# Patient Record
Sex: FEMALE | Race: WHITE | HISPANIC OR LATINO
[De-identification: names, ages, dates, MRNs, and addresses within clinical notes are randomized per-mention and may not be internally consistent; named-entity substitution may affect disease eponyms.]

---

## 2019-07-18 ENCOUNTER — ASOB RESULT (OUTPATIENT)
Age: 29
End: 2019-07-18

## 2019-07-18 ENCOUNTER — APPOINTMENT (OUTPATIENT)
Dept: ANTEPARTUM | Facility: CLINIC | Age: 29
End: 2019-07-18
Payer: COMMERCIAL

## 2019-07-18 PROBLEM — Z00.00 ENCOUNTER FOR PREVENTIVE HEALTH EXAMINATION: Status: ACTIVE | Noted: 2019-07-18

## 2019-07-18 PROCEDURE — 36416 COLLJ CAPILLARY BLOOD SPEC: CPT

## 2019-07-18 PROCEDURE — 76801 OB US < 14 WKS SINGLE FETUS: CPT

## 2019-07-18 PROCEDURE — 76813 OB US NUCHAL MEAS 1 GEST: CPT

## 2019-09-14 ENCOUNTER — APPOINTMENT (OUTPATIENT)
Dept: ANTEPARTUM | Facility: CLINIC | Age: 29
End: 2019-09-14
Payer: COMMERCIAL

## 2019-09-14 ENCOUNTER — ASOB RESULT (OUTPATIENT)
Age: 29
End: 2019-09-14

## 2019-09-14 PROCEDURE — 76811 OB US DETAILED SNGL FETUS: CPT

## 2019-10-07 ENCOUNTER — OUTPATIENT (OUTPATIENT)
Dept: INPATIENT UNIT | Facility: HOSPITAL | Age: 29
LOS: 1 days | Discharge: HOME | End: 2019-10-07
Payer: COMMERCIAL

## 2019-10-07 VITALS
DIASTOLIC BLOOD PRESSURE: 77 MMHG | TEMPERATURE: 98 F | RESPIRATION RATE: 16 BRPM | SYSTOLIC BLOOD PRESSURE: 103 MMHG | HEART RATE: 79 BPM

## 2019-10-07 DIAGNOSIS — Z3A.00 WEEKS OF GESTATION OF PREGNANCY NOT SPECIFIED: ICD-10-CM

## 2019-10-07 DIAGNOSIS — Z98.82 BREAST IMPLANT STATUS: Chronic | ICD-10-CM

## 2019-10-07 DIAGNOSIS — O26.899 OTHER SPECIFIED PREGNANCY RELATED CONDITIONS, UNSPECIFIED TRIMESTER: ICD-10-CM

## 2019-10-07 LAB
APPEARANCE UR: SIGNIFICANT CHANGE UP
BACTERIA # UR AUTO: NEGATIVE — SIGNIFICANT CHANGE UP
BILIRUB UR-MCNC: NEGATIVE — SIGNIFICANT CHANGE UP
BLD GP AB SCN SERPL QL: NEGATIVE — SIGNIFICANT CHANGE UP
BLOOD UR QL VISUAL: SIGNIFICANT CHANGE UP
COLOR SPEC: SIGNIFICANT CHANGE UP
EPI CELLS # UR: SIGNIFICANT CHANGE UP
FIBRINOGEN PPP-MCNC: 689 MG/DL — HIGH (ref 350–510)
GLUCOSE UR-MCNC: NEGATIVE — SIGNIFICANT CHANGE UP
HCT VFR BLD CALC: 35 % — SIGNIFICANT CHANGE UP (ref 34.5–45)
HGB BLD-MCNC: 11.7 G/DL — SIGNIFICANT CHANGE UP (ref 11.5–15.5)
HYALINE CASTS # UR AUTO: NEGATIVE — SIGNIFICANT CHANGE UP
KETONES UR-MCNC: NEGATIVE — SIGNIFICANT CHANGE UP
LEUKOCYTE ESTERASE UR-ACNC: SIGNIFICANT CHANGE UP
MCHC RBC-ENTMCNC: 33.4 % — SIGNIFICANT CHANGE UP (ref 32–36)
MCHC RBC-ENTMCNC: 34.1 PG — HIGH (ref 27–34)
MCV RBC AUTO: 102 FL — HIGH (ref 80–100)
NITRITE UR-MCNC: NEGATIVE — SIGNIFICANT CHANGE UP
NRBC # FLD: 0 K/UL — SIGNIFICANT CHANGE UP (ref 0–0)
PH UR: 6.5 — SIGNIFICANT CHANGE UP (ref 5–8)
PLATELET # BLD AUTO: 176 K/UL — SIGNIFICANT CHANGE UP (ref 150–400)
PMV BLD: 10.8 FL — SIGNIFICANT CHANGE UP (ref 7–13)
PROT UR-MCNC: NEGATIVE — SIGNIFICANT CHANGE UP
RBC # BLD: 3.43 M/UL — LOW (ref 3.8–5.2)
RBC # FLD: 11.9 % — SIGNIFICANT CHANGE UP (ref 10.3–14.5)
RBC CASTS # UR COMP ASSIST: SIGNIFICANT CHANGE UP (ref 0–?)
RH IG SCN BLD-IMP: POSITIVE — SIGNIFICANT CHANGE UP
SP GR SPEC: 1.01 — SIGNIFICANT CHANGE UP (ref 1–1.04)
SQUAMOUS # UR AUTO: SIGNIFICANT CHANGE UP
UROBILINOGEN FLD QL: NORMAL — SIGNIFICANT CHANGE UP
WBC # BLD: 9.64 K/UL — SIGNIFICANT CHANGE UP (ref 3.8–10.5)
WBC # FLD AUTO: 9.64 K/UL — SIGNIFICANT CHANGE UP (ref 3.8–10.5)
WBC UR QL: HIGH (ref 0–?)

## 2019-10-07 PROCEDURE — 59025 FETAL NON-STRESS TEST: CPT | Mod: 26

## 2019-10-07 NOTE — OB PROVIDER TRIAGE NOTE - NSOBPROVIDERNOTE_OBGYN_ALL_OB_FT
@ 23.4 wks sent in from the office r/o abruption secondary to bright red blood noted on speculum exam in office. patient reports vaginal spotting (pinktinged) for past three days when she uses the bathroom, denies active bleeding, denies recent intercourse, denies vaginal inserts. Denies abdominal cramping or pain. Reports positive fetal movement  PNI: hx of low lying placenta,  not seen during today's sonogram    VS: 103/77  TOCo: ctx none  NST:   TVS: thick white discharge consistent with yeast, blood tinged discharge, no active bleeding. Cervical polyp x2 noted protruding from the cervix. Cervical Os closed  TVS: CL 4.5 cm, no dynamic changes, no funneling, no previa noted, placenta greater than 2 cm away from Os  TAS: breech, posterior placenta, gross fetal movement. adequate fluid  @ 23.4 wks sent in from the office r/o abruption secondary to bright red blood noted on speculum exam in office. patient reports vaginal spotting (pinktinged) for past three days when she uses the bathroom, denies active bleeding, denies recent intercourse, denies vaginal inserts. Denies abdominal cramping or pain. Reports positive fetal movement  PNI: hx of low lying placenta,  not seen during today's sonogram    VS: 103/77  TOCo: ctx none  NST:   TVS: thick white discharge consistent with yeast, blood tinged discharge, no active bleeding. Cervical polyp x2 noted protruding from the cervix. Cervical Os closed  TVS: CL 4.5 cm, no dynamic changes, no funneling, no previa noted, placenta greater than 2 cm away from Os  TAS: breech, posterior placenta, gross fetal movement. adequate fluid    @10:17 pm  Abruption labs negative  Type and screen pending  Patient requesting to leave  Dr. Alexandra contacted will call patient with lab results as needed

## 2019-10-07 NOTE — OB PROVIDER TRIAGE NOTE - HISTORY OF PRESENT ILLNESS
@ 23.4 wks sent in from the office r/o abruption secondary to bright red blood noted on speculum exam in office. patient reports vaginal spotting (pinktinged) for past three days when she uses the bathroom, denies active bleeding, denies recent intercourse, denies vaginal inserts. Denies abdominal cramping or pain. Reports positive fetal movement  PNI: hx of low lying placenta,  not seen during today's sonogram

## 2019-10-07 NOTE — OB RN TRIAGE NOTE - PSH
Pt says someone called him today He did not know who it was Pt has order in system for MRI Lspine he wants done at [address] [facility] on [address] [facility] does not have a number for location History of bilateral breast implants  2010

## 2019-10-07 NOTE — OB PROVIDER TRIAGE NOTE - NSHPLABSRESULTS_GEN_ALL_CORE
11.7   9.64  )-----------( 176      ( 07 Oct 2019 20:45 )             35.0                 Urinalysis Basic - ( 07 Oct 2019 20:45 )    Color: LIGHT YELLOW / Appearance: Lt TURBID / S.012 / pH: 6.5  Gluc: NEGATIVE / Ketone: NEGATIVE  / Bili: NEGATIVE / Urobili: NORMAL   Blood: SMALL / Protein: NEGATIVE / Nitrite: NEGATIVE   Leuk Esterase: LARGE / RBC: 3-5 / WBC 26-50   Sq Epi: FEW / Non Sq Epi: MODERATE / Bacteria: NEGATIVE

## 2019-10-07 NOTE — OB PROVIDER TRIAGE NOTE - NSHPPHYSICALEXAM_GEN_ALL_CORE
VS: 103/77  TOCo: ctx none  NST:   TVS: thick white discharge consistent with yeast, blood tinged discharge, no active bleeding. Cervical polyp x2 noted protruding from the cervix. Cervical Os closed  TVS: CL 4.5 cm, no dynamic changes, no funneling, no previa noted, plaacenta greater than 2 cm away from Os  TAS: breech, posterior placenta, gross fetal movement. adequate fluid

## 2020-01-29 ENCOUNTER — OUTPATIENT (OUTPATIENT)
Dept: INPATIENT UNIT | Facility: HOSPITAL | Age: 30
LOS: 1 days | Discharge: HOME | End: 2020-01-29
Payer: COMMERCIAL

## 2020-01-29 VITALS — HEART RATE: 61 BPM | DIASTOLIC BLOOD PRESSURE: 87 MMHG | SYSTOLIC BLOOD PRESSURE: 134 MMHG

## 2020-01-29 VITALS
RESPIRATION RATE: 17 BRPM | DIASTOLIC BLOOD PRESSURE: 68 MMHG | SYSTOLIC BLOOD PRESSURE: 115 MMHG | HEART RATE: 60 BPM | TEMPERATURE: 98 F

## 2020-01-29 DIAGNOSIS — Z3A.00 WEEKS OF GESTATION OF PREGNANCY NOT SPECIFIED: ICD-10-CM

## 2020-01-29 DIAGNOSIS — O26.899 OTHER SPECIFIED PREGNANCY RELATED CONDITIONS, UNSPECIFIED TRIMESTER: ICD-10-CM

## 2020-01-29 DIAGNOSIS — Z98.82 BREAST IMPLANT STATUS: Chronic | ICD-10-CM

## 2020-01-29 PROCEDURE — 59025 FETAL NON-STRESS TEST: CPT | Mod: 26

## 2020-01-29 PROCEDURE — 99202 OFFICE O/P NEW SF 15 MIN: CPT

## 2020-01-30 ENCOUNTER — INPATIENT (INPATIENT)
Facility: HOSPITAL | Age: 30
LOS: 1 days | Discharge: HOME | End: 2020-02-01
Attending: OBSTETRICS & GYNECOLOGY | Admitting: OBSTETRICS & GYNECOLOGY

## 2020-01-30 ENCOUNTER — TRANSCRIPTION ENCOUNTER (OUTPATIENT)
Age: 30
End: 2020-01-30

## 2020-01-30 VITALS
DIASTOLIC BLOOD PRESSURE: 88 MMHG | SYSTOLIC BLOOD PRESSURE: 138 MMHG | RESPIRATION RATE: 16 BRPM | HEART RATE: 76 BPM | TEMPERATURE: 98 F

## 2020-01-30 DIAGNOSIS — Z3A.00 WEEKS OF GESTATION OF PREGNANCY NOT SPECIFIED: ICD-10-CM

## 2020-01-30 DIAGNOSIS — O26.899 OTHER SPECIFIED PREGNANCY RELATED CONDITIONS, UNSPECIFIED TRIMESTER: ICD-10-CM

## 2020-01-30 DIAGNOSIS — Z98.82 BREAST IMPLANT STATUS: Chronic | ICD-10-CM

## 2020-01-30 PROBLEM — L40.9 PSORIASIS, UNSPECIFIED: Chronic | Status: ACTIVE | Noted: 2019-10-07

## 2020-01-30 LAB
BASOPHILS # BLD AUTO: 0.02 K/UL — SIGNIFICANT CHANGE UP (ref 0–0.2)
BASOPHILS NFR BLD AUTO: 0.1 % — SIGNIFICANT CHANGE UP (ref 0–2)
BLD GP AB SCN SERPL QL: NEGATIVE — SIGNIFICANT CHANGE UP
EOSINOPHIL # BLD AUTO: 0.03 K/UL — SIGNIFICANT CHANGE UP (ref 0–0.5)
EOSINOPHIL NFR BLD AUTO: 0.2 % — SIGNIFICANT CHANGE UP (ref 0–6)
HCT VFR BLD CALC: 36.5 % — SIGNIFICANT CHANGE UP (ref 34.5–45)
HGB BLD-MCNC: 13.1 G/DL — SIGNIFICANT CHANGE UP (ref 11.5–15.5)
IMM GRANULOCYTES NFR BLD AUTO: 0.4 % — SIGNIFICANT CHANGE UP (ref 0–1.5)
LYMPHOCYTES # BLD AUTO: 17.7 % — SIGNIFICANT CHANGE UP (ref 13–44)
LYMPHOCYTES # BLD AUTO: 2.5 K/UL — SIGNIFICANT CHANGE UP (ref 1–3.3)
MCHC RBC-ENTMCNC: 34.8 PG — HIGH (ref 27–34)
MCHC RBC-ENTMCNC: 35.9 % — SIGNIFICANT CHANGE UP (ref 32–36)
MCV RBC AUTO: 97.1 FL — SIGNIFICANT CHANGE UP (ref 80–100)
MONOCYTES # BLD AUTO: 1 K/UL — HIGH (ref 0–0.9)
MONOCYTES NFR BLD AUTO: 7.1 % — SIGNIFICANT CHANGE UP (ref 2–14)
NEUTROPHILS # BLD AUTO: 10.54 K/UL — HIGH (ref 1.8–7.4)
NEUTROPHILS NFR BLD AUTO: 74.5 % — SIGNIFICANT CHANGE UP (ref 43–77)
NRBC # FLD: 0 K/UL — SIGNIFICANT CHANGE UP (ref 0–0)
PLATELET # BLD AUTO: 161 K/UL — SIGNIFICANT CHANGE UP (ref 150–400)
PMV BLD: 10.6 FL — SIGNIFICANT CHANGE UP (ref 7–13)
RBC # BLD: 3.76 M/UL — LOW (ref 3.8–5.2)
RBC # FLD: 12.3 % — SIGNIFICANT CHANGE UP (ref 10.3–14.5)
RH IG SCN BLD-IMP: POSITIVE — SIGNIFICANT CHANGE UP
T PALLIDUM AB TITR SER: NEGATIVE — SIGNIFICANT CHANGE UP
WBC # BLD: 14.14 K/UL — HIGH (ref 3.8–10.5)
WBC # FLD AUTO: 14.14 K/UL — HIGH (ref 3.8–10.5)

## 2020-01-30 RX ORDER — OXYTOCIN 10 UNIT/ML
333.33 VIAL (ML) INJECTION
Qty: 20 | Refills: 0 | Status: COMPLETED | OUTPATIENT
Start: 2020-01-30 | End: 2020-01-30

## 2020-01-30 RX ORDER — SODIUM CHLORIDE 9 MG/ML
500 INJECTION, SOLUTION INTRAVENOUS ONCE
Refills: 0 | Status: COMPLETED | OUTPATIENT
Start: 2020-01-30 | End: 2020-01-30

## 2020-01-30 RX ORDER — IBUPROFEN 200 MG
600 TABLET ORAL EVERY 6 HOURS
Refills: 0 | Status: COMPLETED | OUTPATIENT
Start: 2020-01-30 | End: 2020-12-28

## 2020-01-30 RX ORDER — BENZOCAINE 10 %
1 GEL (GRAM) MUCOUS MEMBRANE EVERY 6 HOURS
Refills: 0 | Status: DISCONTINUED | OUTPATIENT
Start: 2020-01-30 | End: 2020-02-01

## 2020-01-30 RX ORDER — DIBUCAINE 1 %
1 OINTMENT (GRAM) RECTAL EVERY 6 HOURS
Refills: 0 | Status: DISCONTINUED | OUTPATIENT
Start: 2020-01-30 | End: 2020-02-01

## 2020-01-30 RX ORDER — DIPHENHYDRAMINE HCL 50 MG
25 CAPSULE ORAL EVERY 6 HOURS
Refills: 0 | Status: DISCONTINUED | OUTPATIENT
Start: 2020-01-30 | End: 2020-02-01

## 2020-01-30 RX ORDER — IBUPROFEN 200 MG
600 TABLET ORAL EVERY 6 HOURS
Refills: 0 | Status: DISCONTINUED | OUTPATIENT
Start: 2020-01-30 | End: 2020-02-01

## 2020-01-30 RX ORDER — SIMETHICONE 80 MG/1
80 TABLET, CHEWABLE ORAL EVERY 4 HOURS
Refills: 0 | Status: DISCONTINUED | OUTPATIENT
Start: 2020-01-30 | End: 2020-02-01

## 2020-01-30 RX ORDER — OXYCODONE HYDROCHLORIDE 5 MG/1
5 TABLET ORAL ONCE
Refills: 0 | Status: DISCONTINUED | OUTPATIENT
Start: 2020-01-30 | End: 2020-02-01

## 2020-01-30 RX ORDER — SODIUM CHLORIDE 9 MG/ML
3 INJECTION INTRAMUSCULAR; INTRAVENOUS; SUBCUTANEOUS EVERY 8 HOURS
Refills: 0 | Status: DISCONTINUED | OUTPATIENT
Start: 2020-01-30 | End: 2020-02-01

## 2020-01-30 RX ORDER — HYDROCORTISONE 1 %
1 OINTMENT (GRAM) TOPICAL EVERY 6 HOURS
Refills: 0 | Status: DISCONTINUED | OUTPATIENT
Start: 2020-01-30 | End: 2020-02-01

## 2020-01-30 RX ORDER — ACETAMINOPHEN 500 MG
975 TABLET ORAL
Refills: 0 | Status: DISCONTINUED | OUTPATIENT
Start: 2020-01-30 | End: 2020-02-01

## 2020-01-30 RX ORDER — LANOLIN
1 OINTMENT (GRAM) TOPICAL EVERY 6 HOURS
Refills: 0 | Status: DISCONTINUED | OUTPATIENT
Start: 2020-01-30 | End: 2020-02-01

## 2020-01-30 RX ORDER — AER TRAVELER 0.5 G/1
1 SOLUTION RECTAL; TOPICAL EVERY 4 HOURS
Refills: 0 | Status: DISCONTINUED | OUTPATIENT
Start: 2020-01-30 | End: 2020-02-01

## 2020-01-30 RX ORDER — MAGNESIUM HYDROXIDE 400 MG/1
30 TABLET, CHEWABLE ORAL
Refills: 0 | Status: DISCONTINUED | OUTPATIENT
Start: 2020-01-30 | End: 2020-02-01

## 2020-01-30 RX ORDER — KETOROLAC TROMETHAMINE 30 MG/ML
30 SYRINGE (ML) INJECTION ONCE
Refills: 0 | Status: DISCONTINUED | OUTPATIENT
Start: 2020-01-30 | End: 2020-01-30

## 2020-01-30 RX ORDER — GLYCERIN ADULT
1 SUPPOSITORY, RECTAL RECTAL AT BEDTIME
Refills: 0 | Status: DISCONTINUED | OUTPATIENT
Start: 2020-01-30 | End: 2020-02-01

## 2020-01-30 RX ORDER — PRAMOXINE HYDROCHLORIDE 150 MG/15G
1 AEROSOL, FOAM RECTAL EVERY 4 HOURS
Refills: 0 | Status: DISCONTINUED | OUTPATIENT
Start: 2020-01-30 | End: 2020-02-01

## 2020-01-30 RX ORDER — SODIUM CHLORIDE 9 MG/ML
1000 INJECTION, SOLUTION INTRAVENOUS
Refills: 0 | Status: DISCONTINUED | OUTPATIENT
Start: 2020-01-30 | End: 2020-01-30

## 2020-01-30 RX ORDER — TETANUS TOXOID, REDUCED DIPHTHERIA TOXOID AND ACELLULAR PERTUSSIS VACCINE, ADSORBED 5; 2.5; 8; 8; 2.5 [IU]/.5ML; [IU]/.5ML; UG/.5ML; UG/.5ML; UG/.5ML
0.5 SUSPENSION INTRAMUSCULAR ONCE
Refills: 0 | Status: DISCONTINUED | OUTPATIENT
Start: 2020-01-30 | End: 2020-02-01

## 2020-01-30 RX ORDER — OXYCODONE HYDROCHLORIDE 5 MG/1
5 TABLET ORAL
Refills: 0 | Status: DISCONTINUED | OUTPATIENT
Start: 2020-01-30 | End: 2020-02-01

## 2020-01-30 RX ORDER — OXYTOCIN 10 UNIT/ML
2 VIAL (ML) INJECTION
Qty: 30 | Refills: 0 | Status: DISCONTINUED | OUTPATIENT
Start: 2020-01-30 | End: 2020-02-01

## 2020-01-30 RX ADMIN — SODIUM CHLORIDE 500 MILLILITER(S): 9 INJECTION, SOLUTION INTRAVENOUS at 11:40

## 2020-01-30 RX ADMIN — Medication 600 MILLIGRAM(S): at 18:15

## 2020-01-30 RX ADMIN — Medication 600 MILLIGRAM(S): at 17:45

## 2020-01-30 RX ADMIN — Medication 2 MILLIUNIT(S)/MIN: at 05:00

## 2020-01-30 RX ADMIN — Medication 30 MILLIGRAM(S): at 08:25

## 2020-01-30 RX ADMIN — Medication 975 MILLIGRAM(S): at 21:30

## 2020-01-30 RX ADMIN — Medication 1000 MILLIUNIT(S)/MIN: at 08:25

## 2020-01-30 RX ADMIN — Medication 30 MILLIGRAM(S): at 09:41

## 2020-01-30 RX ADMIN — Medication 975 MILLIGRAM(S): at 21:01

## 2020-01-30 RX ADMIN — SODIUM CHLORIDE 125 MILLILITER(S): 9 INJECTION, SOLUTION INTRAVENOUS at 09:41

## 2020-01-30 RX ADMIN — Medication 0.25 MILLIGRAM(S): at 03:38

## 2020-01-30 NOTE — OB PROVIDER DELIVERY SUMMARY - NSPROVIDERDELIVERYNOTE_OBGYN_ALL_OB_FT
, female, nuchal cord x1 reduced, APGARS 9/9, weight 6lb 13oz   RML episiotomy repaired with vicryl suture   ebl 400cc

## 2020-01-30 NOTE — OB PROVIDER TRIAGE NOTE - ADDITIONAL INSTRUCTIONS
Patient cleared for discharge home at this time  labor precautions reviewed  fetal kick counts emphasized

## 2020-01-30 NOTE — OB PROVIDER TRIAGE NOTE - NSOBPROVIDERNOTE_OBGYN_ALL_OB_FT
pmh: denies  psh: breast implant 2010  obhx: denies  gynhx: denies    NKDA    Medications  PNV  Iron    Assessment  Vital Signs Last 24 Hrs  T(C): 36.6 (29 Jan 2020 23:23), Max: 36.6 (29 Jan 2020 23:23)  T(F): 97.88 (29 Jan 2020 23:23), Max: 97.88 (29 Jan 2020 23:23)  HR: 61 (29 Jan 2020 23:50) (58 - 61)  BP: 134/87 (29 Jan 2020 23:50) (115/68 - 134/87)  BP(mean): --  RR: 17 (29 Jan 2020 22:27) (17 - 17)  SpO2: --    regular heart rate; rhythm   lungs CTA     abdomen soft nontender gravid  (+) FHR; moderate variability; with accelerations  irregular uterine contractions noted on tocometer    Vaginal Exam: 2/50/-3    Biophysical Profile: 8/8  Amniotic Fluid Index: 11.59cm    Evidence of latent labor at this time    Patient cleared for discharge home at this time  labor precautions reviewed  fetal kick counts emphasized    patient discharged home ambulatory and stable    d/w Dr. Campos pmh: denies  psh: breast implant 2010  obhx: denies  gynhx: denies    NKDA    Medications  PNV  Iron    Assessment  Vital Signs Last 24 Hrs  T(C): 36.6 (29 Jan 2020 23:23), Max: 36.6 (29 Jan 2020 23:23)  T(F): 97.88 (29 Jan 2020 23:23), Max: 97.88 (29 Jan 2020 23:23)  HR: 61 (29 Jan 2020 23:50) (58 - 61)  BP: 134/87 (29 Jan 2020 23:50) (115/68 - 134/87)  BP(mean): --  RR: 17 (29 Jan 2020 22:27) (17 - 17)  SpO2: --    regular heart rate; rhythm   lungs CTA     abdomen soft nontender gravid  (+) FHR; moderate variability; with accelerations  irregular uterine contractions noted on tocometer    Vaginal Exam: 2/50/-3    Biophysical Profile: 8/8  Amniotic Fluid Index: 11.59cm  Cephalic; anterior placenta    Evidence of latent labor at this time    Patient cleared for discharge home at this time  labor precautions reviewed  fetal kick counts emphasized    patient discharged home ambulatory and stable    d/w Dr. Campos

## 2020-01-30 NOTE — OB PROVIDER TRIAGE NOTE - NSHPPHYSICALEXAM_GEN_ALL_CORE
Vital Signs Last 24 Hrs  T(C): 36.8 (30 Jan 2020 03:13), Max: 36.8 (30 Jan 2020 03:13)  T(F): 98.2 (30 Jan 2020 03:13), Max: 98.2 (30 Jan 2020 03:13)  HR: 76 (30 Jan 2020 03:20) (58 - 76)  BP: 138/88 (30 Jan 2020 03:20) (115/68 - 138/88)  BP(mean): --  RR: 16 (30 Jan 2020 03:13) (16 - 17)  SpO2: --    regular heart rate; rhythm   lungs CTA     abdomen soft nontender gravid  (+) FHR; moderate variability; with accelerations  irregular uterine contractions noted on tocometer    Vaginal Exam: 5/70/-3      Cephalic; anterior placenta

## 2020-01-30 NOTE — OB NEONATOLOGY/PEDIATRICIAN DELIVERY SUMMARY - NSPEDSNEONOTESA_OBGYN_ALL_OB_FT
40.0 week female born via  with a cat II tracing to a 30 y/o  O+, GBS- () , PNL unremarkable with SROM 3 hrs PTD and clear fluid. No significant maternal medical history. Infant emerged with nuchal X 1 but good cry and apgars 8/9. Routine care provided and transferred to well baby nursery.

## 2020-01-30 NOTE — OB PROVIDER TRIAGE NOTE - HISTORY OF PRESENT ILLNESS
29y.o.  G1 at 40weeks returning  with complaints of increased uterine contractions since discharge, now occurring q1 minutes 10/10 pain.  Patient reports positive fetal movement, denies leakage of fluid, denies vaginal bleeding.    a/p course complications patient denies

## 2020-01-30 NOTE — CHART NOTE - NSCHARTNOTEFT_GEN_A_CORE
late entry for exam @ 03:35   patient seen in triage for prolonged decel   VE: 5/90/-2, SROM, ISE placed   patient given 0.25 mg terbutaline, repositioning, O2 mask and fluid bolus   recovery of FHR to baseline after resuscitative measures   patient moved to LDR 16 after which epidural was successfully placed by anesthesia   pt rexamined at 04:25 and VE 5.5/100/0 at that time, IUPC placed   plan for pitocin augmentation

## 2020-01-30 NOTE — OB PROVIDER TRIAGE NOTE - NSHPPHYSICALEXAM_GEN_ALL_CORE
Vital Signs Last 24 Hrs  T(C): 36.6 (29 Jan 2020 23:23), Max: 36.6 (29 Jan 2020 23:23)  T(F): 97.88 (29 Jan 2020 23:23), Max: 97.88 (29 Jan 2020 23:23)  HR: 61 (29 Jan 2020 23:50) (58 - 61)  BP: 134/87 (29 Jan 2020 23:50) (115/68 - 134/87)  BP(mean): --  RR: 17 (29 Jan 2020 22:27) (17 - 17)  SpO2: --    regular heart rate; rhythm   lungs CTA     abdomen soft nontender gravid  (+) FHR; moderate variability; with accelerations  irregular uterine contractions noted on tocometer    Vaginal Exam: 2/50/-3    Biophysical Profile: 8/8  Amniotic Fluid Index: 11.59cm Vital Signs Last 24 Hrs  T(C): 36.6 (29 Jan 2020 23:23), Max: 36.6 (29 Jan 2020 23:23)  T(F): 97.88 (29 Jan 2020 23:23), Max: 97.88 (29 Jan 2020 23:23)  HR: 61 (29 Jan 2020 23:50) (58 - 61)  BP: 134/87 (29 Jan 2020 23:50) (115/68 - 134/87)  BP(mean): --  RR: 17 (29 Jan 2020 22:27) (17 - 17)  SpO2: --    regular heart rate; rhythm   lungs CTA     abdomen soft nontender gravid  (+) FHR; moderate variability; with accelerations  irregular uterine contractions noted on tocometer    Vaginal Exam: 2/50/-3    Biophysical Profile: 8/8  Amniotic Fluid Index: 11.59cm    Cephalic; anterior placenta

## 2020-01-30 NOTE — DISCHARGE NOTE OB - PATIENT PORTAL LINK FT
You can access the FollowMyHealth Patient Portal offered by [facility] by registering at the following website: [email or web address]. By joining [facility]s FollowMyHealth portal, you will also be able to view your health information using other applications (apps) compatible with our system.

## 2020-01-30 NOTE — OB RN DELIVERY SUMMARY - NS_SEPSISRSKCALC_OBGYN_ALL_OB_FT
EOS calculated successfully. EOS Risk Factor: 0.5/1000 live births ([facility] national incidence); GA=40w;Temp=98.6; ROM=3.283; GBS='Negative'; Antibiotics='No antibiotics or any antibiotics < 2 hrs prior to birth'

## 2020-01-30 NOTE — OB PROVIDER H&P - ASSESSMENT
pmh: denies  psh: breast implant 2010  obhx: denies  gynhx: denies    NKDA    Medications  PNV  Iron    Assessment  Vital Signs Last 24 Hrs  T(C): 36.6 (29 Jan 2020 23:23), Max: 36.6 (29 Jan 2020 23:23)  T(F): 97.88 (29 Jan 2020 23:23), Max: 97.88 (29 Jan 2020 23:23)  HR: 61 (29 Jan 2020 23:50) (58 - 61)  BP: 134/87 (29 Jan 2020 23:50) (115/68 - 134/87)  BP(mean): --  RR: 17 (29 Jan 2020 22:27) (17 - 17)  SpO2: --    regular heart rate; rhythm   lungs CTA     abdomen soft nontender gravid  (+) FHR; moderate variability; with accelerations  irregular uterine contractions noted on tocometer    Vaginal Exam: 5/70/-3    Cephalic; anterior placenta  GBS (-)    Evidence of active labor at this time    Plan  Admit to Labor and Delivery for Labor at term  Routine admission labs  Epidural for pain management  Continous EFM and Parole monitoring        d/w Dr. Patterson & Dr. Bacon PGY-3

## 2020-01-30 NOTE — DISCHARGE NOTE OB - MEDICATION SUMMARY - MEDICATIONS TO TAKE
I will START or STAY ON the medications listed below when I get home from the hospital:    iron  -- Indication: For Vaginal delivery    acetaminophen 325 mg oral tablet  -- 3 tab(s) by mouth every 4 to 6 hours, As Needed  -- Indication: For Vaginal delivery    ibuprofen 600 mg oral tablet  -- 1 tab(s) by mouth every 6 hours, As Needed  -- Indication: For Vaginal delivery    benzocaine 20% topical spray  -- 1 spray(s) on skin every 6 hours, As needed, for Perineal discomfort  -- Indication: For Vaginal delivery    dibucaine 1% topical ointment  -- 1 application on skin every 6 hours, As needed, Perineal discomfort  -- Indication: For Vaginal delivery    Prenatal 1 oral capsule  -- Indication: For Vaginal delivery

## 2020-01-30 NOTE — DISCHARGE NOTE OB - CARE PROVIDER_API CALL
Galo Cuellar)  Obstetrics and Gynecology Obstetrics and Gynocology  [address] [place] [address]  Phone: [phone]  Fax: [phone]  Follow Up Time:

## 2020-01-30 NOTE — CHART NOTE - NSCHARTNOTEFT_GEN_A_CORE
tracing reviewed; prolonged deceleration at 0325  fetal heart resuscitation implemented   repeat vaginal exam; noted to be unchanged   patient appears grossly ruptured; clear fluid     0327 Dr. Cuellar made aware of patient deceleration; presented to bedside  repeat vaginal exam performed; noted to be unchanged    0327 Dr. Bacon made aware of patient deceleration  preparations made for possible emergency delivery     0331 deceleration recovered    0335 prolonged deceleration noted recurring;   Dr. Cuellar presented to beside again. repeat vaginal exam  patient placed in "all fours"    0340;  Dr. King, Dr. Zepeda made aware of deceleration and possible emergency delivery  0341: anesthesia made aware;   0350; Dr. Escalona anesthesia at patient bedside     patient transferred to LDR [id number]

## 2020-01-31 RX ORDER — ACETAMINOPHEN 500 MG
3 TABLET ORAL
Qty: 0 | Refills: 0 | DISCHARGE
Start: 2020-01-31

## 2020-01-31 RX ORDER — BENZOCAINE 10 %
1 GEL (GRAM) MUCOUS MEMBRANE
Qty: 0 | Refills: 0 | DISCHARGE
Start: 2020-01-31

## 2020-01-31 RX ORDER — IBUPROFEN 200 MG
1 TABLET ORAL
Qty: 0 | Refills: 0 | DISCHARGE
Start: 2020-01-31

## 2020-01-31 RX ORDER — DIBUCAINE 1 %
1 OINTMENT (GRAM) RECTAL
Qty: 0 | Refills: 0 | DISCHARGE
Start: 2020-01-31

## 2020-01-31 RX ADMIN — Medication 600 MILLIGRAM(S): at 13:17

## 2020-01-31 RX ADMIN — Medication 600 MILLIGRAM(S): at 18:40

## 2020-01-31 RX ADMIN — MAGNESIUM HYDROXIDE 30 MILLILITER(S): 400 TABLET, CHEWABLE ORAL at 18:42

## 2020-01-31 RX ADMIN — Medication 600 MILLIGRAM(S): at 14:10

## 2020-01-31 RX ADMIN — Medication 975 MILLIGRAM(S): at 21:01

## 2020-01-31 RX ADMIN — Medication 975 MILLIGRAM(S): at 15:52

## 2020-01-31 RX ADMIN — Medication 975 MILLIGRAM(S): at 16:45

## 2020-01-31 RX ADMIN — Medication 600 MILLIGRAM(S): at 07:05

## 2020-01-31 RX ADMIN — Medication 975 MILLIGRAM(S): at 10:15

## 2020-01-31 RX ADMIN — Medication 975 MILLIGRAM(S): at 09:25

## 2020-01-31 RX ADMIN — Medication 600 MILLIGRAM(S): at 19:15

## 2020-01-31 RX ADMIN — Medication 1 TABLET(S): at 13:17

## 2020-01-31 RX ADMIN — Medication 600 MILLIGRAM(S): at 00:28

## 2020-01-31 RX ADMIN — Medication 600 MILLIGRAM(S): at 01:00

## 2020-01-31 RX ADMIN — Medication 600 MILLIGRAM(S): at 06:45

## 2020-01-31 RX ADMIN — Medication 975 MILLIGRAM(S): at 21:30

## 2020-01-31 NOTE — PROGRESS NOTE ADULT - ASSESSMENT
Physical Exam:     Gen: A&Ox 3, NAD  Chest: CTA B/L  Cardiac: S1,S2; RRR  Breast: Soft, nontender, nonengorged  Abdomen: +BS, Soft, nontender, ND; Fundus firm below umbilicus  Gyn: mod lochia, intact/repaired  Ext: Nontender, DTRS 2+, no worsening edema

## 2020-01-31 NOTE — PROGRESS NOTE ADULT - SUBJECTIVE AND OBJECTIVE BOX
NP: SAMI day 1 Progress Note:     Patient seen at bedside resting comfortably offers no current complaints. Ambulating and voiding without difficulty.  Passing flatus and tolerating regular diet.  Bonding well with .  Breastfeeding exclusively . Denies HA, CP, SOB, N/V/D, dizziness, palpitations,  worsening vaginal bleeding, or any other concerns.      Vital Signs Last 24 Hrs  T(C): 36.9 (2020 09:36), Max: 36.9 (2020 22:00)  T(F): 98.4 (2020 09:36), Max: 98.4 (2020 22:00)  HR: 69 (2020 09:36) (60 - 71)  BP: 104/65 (2020 09:36) (104/65 - 123/64)  BP(mean): --  RR: 16 (2020 09:36) (16 - 17)  SpO2: 100% (2020 09:36) (98% - 100%)                            13.1   14.14 )-----------( 161      ( 2020 03:40 )             36.5       A/P:  PPD#1 s/p   -Continue pain management  -Encourage OOB and ambulation  -Check CBC  -Continue current care  -Plan for discharge tomorrow  -d/w Dr Ovidio Boyd, NEENAP-C  Office #: [phone]

## 2020-01-31 NOTE — LACTATION INITIAL EVALUATION - LACTATION INTERVENTIONS
initiate skin to skin/Instructed and assisted with positioning to facilitate proper latch.  Encouraged to feed on cue and follow the feeding log, reviewed.  Taught hand expression.  Discussed outpatient resources available, warm line , breastfeeding support group.  Discussed breastfeeding strategies to increase breast milk supply.  Encouraged to call for assistance, as needed.  Primary RN made aware of consult and plan./initiate hand expression routine

## 2020-02-01 VITALS
DIASTOLIC BLOOD PRESSURE: 77 MMHG | TEMPERATURE: 98 F | RESPIRATION RATE: 18 BRPM | SYSTOLIC BLOOD PRESSURE: 120 MMHG | OXYGEN SATURATION: 100 % | HEART RATE: 65 BPM

## 2020-02-01 RX ADMIN — Medication 975 MILLIGRAM(S): at 10:30

## 2020-02-01 RX ADMIN — Medication 600 MILLIGRAM(S): at 01:45

## 2020-02-01 RX ADMIN — Medication 975 MILLIGRAM(S): at 09:32

## 2020-02-01 RX ADMIN — Medication 600 MILLIGRAM(S): at 01:14

## 2020-02-01 RX ADMIN — Medication 600 MILLIGRAM(S): at 06:05

## 2020-02-01 RX ADMIN — Medication 600 MILLIGRAM(S): at 05:37

## 2020-02-01 NOTE — PROGRESS NOTE ADULT - SUBJECTIVE AND OBJECTIVE BOX
ANESTHESIA POSTOP CHECK    29y Female POSTOP DAY 1 S/P Labor Epidural    Vital Signs Last 24 Hrs  T(C): 36.8 (01 Feb 2020 06:13), Max: 36.9 (31 Jan 2020 09:36)  T(F): 98.2 (01 Feb 2020 06:13), Max: 98.4 (31 Jan 2020 09:36)  HR: 65 (01 Feb 2020 06:13) (60 - 85)  BP: 120/77 (01 Feb 2020 06:13) (104/65 - 120/77)  BP(mean): --  RR: 18 (01 Feb 2020 06:13) (16 - 18)  SpO2: 100% (01 Feb 2020 06:13) (97% - 100%)  I&O's Summary      [X ] NO APPARENT ANESTHESIA COMPLICATIONS      Comments:

## 2021-04-19 ENCOUNTER — APPOINTMENT (OUTPATIENT)
Dept: ANTEPARTUM | Facility: CLINIC | Age: 31
End: 2021-04-19
Payer: COMMERCIAL

## 2021-04-19 ENCOUNTER — ASOB RESULT (OUTPATIENT)
Age: 31
End: 2021-04-19

## 2021-04-19 PROCEDURE — 76813 OB US NUCHAL MEAS 1 GEST: CPT | Mod: 59

## 2021-04-19 PROCEDURE — 99072 ADDL SUPL MATRL&STAF TM PHE: CPT

## 2021-04-19 PROCEDURE — 76801 OB US < 14 WKS SINGLE FETUS: CPT

## 2021-06-18 ENCOUNTER — APPOINTMENT (OUTPATIENT)
Dept: ANTEPARTUM | Facility: CLINIC | Age: 31
End: 2021-06-18
Payer: COMMERCIAL

## 2021-06-18 ENCOUNTER — ASOB RESULT (OUTPATIENT)
Age: 31
End: 2021-06-18

## 2021-06-18 PROCEDURE — 76805 OB US >/= 14 WKS SNGL FETUS: CPT

## 2021-10-15 ENCOUNTER — ASOB RESULT (OUTPATIENT)
Age: 31
End: 2021-10-15

## 2021-10-15 ENCOUNTER — APPOINTMENT (OUTPATIENT)
Dept: ANTEPARTUM | Facility: HOSPITAL | Age: 31
End: 2021-10-15

## 2021-10-15 ENCOUNTER — APPOINTMENT (OUTPATIENT)
Dept: ANTEPARTUM | Facility: CLINIC | Age: 31
End: 2021-10-15
Payer: COMMERCIAL

## 2021-10-15 PROCEDURE — 76816 OB US FOLLOW-UP PER FETUS: CPT

## 2021-10-15 PROCEDURE — 76819 FETAL BIOPHYS PROFIL W/O NST: CPT

## 2021-10-20 ENCOUNTER — TRANSCRIPTION ENCOUNTER (OUTPATIENT)
Age: 31
End: 2021-10-20

## 2021-10-20 ENCOUNTER — INPATIENT (INPATIENT)
Facility: HOSPITAL | Age: 31
LOS: 0 days | Discharge: HOME | End: 2021-10-21
Attending: OBSTETRICS & GYNECOLOGY | Admitting: OBSTETRICS & GYNECOLOGY

## 2021-10-20 VITALS — TEMPERATURE: 98 F

## 2021-10-20 DIAGNOSIS — O26.899 OTHER SPECIFIED PREGNANCY RELATED CONDITIONS, UNSPECIFIED TRIMESTER: ICD-10-CM

## 2021-10-20 DIAGNOSIS — Z3A.00 WEEKS OF GESTATION OF PREGNANCY NOT SPECIFIED: ICD-10-CM

## 2021-10-20 DIAGNOSIS — Z98.82 BREAST IMPLANT STATUS: Chronic | ICD-10-CM

## 2021-10-20 LAB
BASOPHILS # BLD AUTO: 0.02 K/UL — SIGNIFICANT CHANGE UP (ref 0–0.2)
BASOPHILS NFR BLD AUTO: 0.2 % — SIGNIFICANT CHANGE UP (ref 0–2)
BLD GP AB SCN SERPL QL: NEGATIVE — SIGNIFICANT CHANGE UP
COVID-19 SPIKE DOMAIN AB INTERP: POSITIVE
COVID-19 SPIKE DOMAIN ANTIBODY RESULT: 78.2 U/ML — HIGH
EOSINOPHIL # BLD AUTO: 0.02 K/UL — SIGNIFICANT CHANGE UP (ref 0–0.5)
EOSINOPHIL NFR BLD AUTO: 0.2 % — SIGNIFICANT CHANGE UP (ref 0–6)
HCT VFR BLD CALC: 36.6 % — SIGNIFICANT CHANGE UP (ref 34.5–45)
HGB BLD-MCNC: 13.3 G/DL — SIGNIFICANT CHANGE UP (ref 11.5–15.5)
IANC: 8.86 K/UL — HIGH (ref 1.5–8.5)
IMM GRANULOCYTES NFR BLD AUTO: 0.3 % — SIGNIFICANT CHANGE UP (ref 0–1.5)
LYMPHOCYTES # BLD AUTO: 2.57 K/UL — SIGNIFICANT CHANGE UP (ref 1–3.3)
LYMPHOCYTES # BLD AUTO: 20.6 % — SIGNIFICANT CHANGE UP (ref 13–44)
MCHC RBC-ENTMCNC: 35.8 PG — HIGH (ref 27–34)
MCHC RBC-ENTMCNC: 36.3 GM/DL — HIGH (ref 32–36)
MCV RBC AUTO: 98.4 FL — SIGNIFICANT CHANGE UP (ref 80–100)
MONOCYTES # BLD AUTO: 0.95 K/UL — HIGH (ref 0–0.9)
MONOCYTES NFR BLD AUTO: 7.6 % — SIGNIFICANT CHANGE UP (ref 2–14)
NEUTROPHILS # BLD AUTO: 8.86 K/UL — HIGH (ref 1.8–7.4)
NEUTROPHILS NFR BLD AUTO: 71.1 % — SIGNIFICANT CHANGE UP (ref 43–77)
NRBC # BLD: 0 /100 WBCS — SIGNIFICANT CHANGE UP
NRBC # FLD: 0 K/UL — SIGNIFICANT CHANGE UP
PLATELET # BLD AUTO: 145 K/UL — LOW (ref 150–400)
RBC # BLD: 3.72 M/UL — LOW (ref 3.8–5.2)
RBC # FLD: 11.8 % — SIGNIFICANT CHANGE UP (ref 10.3–14.5)
RH IG SCN BLD-IMP: POSITIVE — SIGNIFICANT CHANGE UP
SARS-COV-2 IGG+IGM SERPL QL IA: 78.2 U/ML — HIGH
SARS-COV-2 IGG+IGM SERPL QL IA: POSITIVE
SARS-COV-2 RNA SPEC QL NAA+PROBE: SIGNIFICANT CHANGE UP
T PALLIDUM AB TITR SER: NEGATIVE — SIGNIFICANT CHANGE UP
WBC # BLD: 12.46 K/UL — HIGH (ref 3.8–10.5)
WBC # FLD AUTO: 12.46 K/UL — HIGH (ref 3.8–10.5)

## 2021-10-20 RX ORDER — SIMETHICONE 80 MG/1
1 TABLET, CHEWABLE ORAL
Qty: 0 | Refills: 0 | DISCHARGE
Start: 2021-10-20

## 2021-10-20 RX ORDER — PRAMOXINE HYDROCHLORIDE 150 MG/15G
1 AEROSOL, FOAM RECTAL EVERY 4 HOURS
Refills: 0 | Status: DISCONTINUED | OUTPATIENT
Start: 2021-10-20 | End: 2021-10-21

## 2021-10-20 RX ORDER — INFLUENZA VIRUS VACCINE 15; 15; 15; 15 UG/.5ML; UG/.5ML; UG/.5ML; UG/.5ML
0.5 SUSPENSION INTRAMUSCULAR ONCE
Refills: 0 | Status: DISCONTINUED | OUTPATIENT
Start: 2021-10-20 | End: 2021-10-21

## 2021-10-20 RX ORDER — DIPHENHYDRAMINE HCL 50 MG
25 CAPSULE ORAL EVERY 6 HOURS
Refills: 0 | Status: DISCONTINUED | OUTPATIENT
Start: 2021-10-20 | End: 2021-10-21

## 2021-10-20 RX ORDER — SIMETHICONE 80 MG/1
80 TABLET, CHEWABLE ORAL EVERY 4 HOURS
Refills: 0 | Status: DISCONTINUED | OUTPATIENT
Start: 2021-10-20 | End: 2021-10-21

## 2021-10-20 RX ORDER — DIBUCAINE 1 %
1 OINTMENT (GRAM) RECTAL EVERY 6 HOURS
Refills: 0 | Status: DISCONTINUED | OUTPATIENT
Start: 2021-10-20 | End: 2021-10-21

## 2021-10-20 RX ORDER — OXYCODONE HYDROCHLORIDE 5 MG/1
5 TABLET ORAL ONCE
Refills: 0 | Status: DISCONTINUED | OUTPATIENT
Start: 2021-10-20 | End: 2021-10-21

## 2021-10-20 RX ORDER — LANOLIN
1 OINTMENT (GRAM) TOPICAL EVERY 6 HOURS
Refills: 0 | Status: DISCONTINUED | OUTPATIENT
Start: 2021-10-20 | End: 2021-10-21

## 2021-10-20 RX ORDER — OXYTOCIN 10 UNIT/ML
333.33 VIAL (ML) INJECTION
Qty: 20 | Refills: 0 | Status: COMPLETED | OUTPATIENT
Start: 2021-10-20 | End: 2021-10-20

## 2021-10-20 RX ORDER — MAGNESIUM HYDROXIDE 400 MG/1
30 TABLET, CHEWABLE ORAL
Refills: 0 | Status: DISCONTINUED | OUTPATIENT
Start: 2021-10-20 | End: 2021-10-21

## 2021-10-20 RX ORDER — MAGNESIUM HYDROXIDE 400 MG/1
30 TABLET, CHEWABLE ORAL
Qty: 0 | Refills: 0 | DISCHARGE
Start: 2021-10-20

## 2021-10-20 RX ORDER — SODIUM CHLORIDE 9 MG/ML
3 INJECTION INTRAMUSCULAR; INTRAVENOUS; SUBCUTANEOUS EVERY 8 HOURS
Refills: 0 | Status: DISCONTINUED | OUTPATIENT
Start: 2021-10-20 | End: 2021-10-21

## 2021-10-20 RX ORDER — ACETAMINOPHEN 500 MG
975 TABLET ORAL
Refills: 0 | Status: DISCONTINUED | OUTPATIENT
Start: 2021-10-20 | End: 2021-10-21

## 2021-10-20 RX ORDER — OXYCODONE HYDROCHLORIDE 5 MG/1
5 TABLET ORAL
Refills: 0 | Status: DISCONTINUED | OUTPATIENT
Start: 2021-10-20 | End: 2021-10-21

## 2021-10-20 RX ORDER — AER TRAVELER 0.5 G/1
1 SOLUTION RECTAL; TOPICAL EVERY 4 HOURS
Refills: 0 | Status: DISCONTINUED | OUTPATIENT
Start: 2021-10-20 | End: 2021-10-21

## 2021-10-20 RX ORDER — IBUPROFEN 200 MG
600 TABLET ORAL EVERY 6 HOURS
Refills: 0 | Status: COMPLETED | OUTPATIENT
Start: 2021-10-20 | End: 2022-09-18

## 2021-10-20 RX ORDER — SODIUM CHLORIDE 9 MG/ML
1000 INJECTION, SOLUTION INTRAVENOUS
Refills: 0 | Status: DISCONTINUED | OUTPATIENT
Start: 2021-10-20 | End: 2021-10-20

## 2021-10-20 RX ORDER — BENZOCAINE 10 %
1 GEL (GRAM) MUCOUS MEMBRANE EVERY 6 HOURS
Refills: 0 | Status: DISCONTINUED | OUTPATIENT
Start: 2021-10-20 | End: 2021-10-21

## 2021-10-20 RX ORDER — AER TRAVELER 0.5 G/1
1 SOLUTION RECTAL; TOPICAL
Qty: 0 | Refills: 0 | DISCHARGE
Start: 2021-10-20

## 2021-10-20 RX ORDER — TETANUS TOXOID, REDUCED DIPHTHERIA TOXOID AND ACELLULAR PERTUSSIS VACCINE, ADSORBED 5; 2.5; 8; 8; 2.5 [IU]/.5ML; [IU]/.5ML; UG/.5ML; UG/.5ML; UG/.5ML
0.5 SUSPENSION INTRAMUSCULAR ONCE
Refills: 0 | Status: DISCONTINUED | OUTPATIENT
Start: 2021-10-20 | End: 2021-10-21

## 2021-10-20 RX ORDER — OXYTOCIN 10 UNIT/ML
333.33 VIAL (ML) INJECTION
Qty: 20 | Refills: 0 | Status: DISCONTINUED | OUTPATIENT
Start: 2021-10-20 | End: 2021-10-21

## 2021-10-20 RX ORDER — IBUPROFEN 200 MG
1 TABLET ORAL
Qty: 0 | Refills: 0 | DISCHARGE
Start: 2021-10-20

## 2021-10-20 RX ORDER — IBUPROFEN 200 MG
600 TABLET ORAL EVERY 6 HOURS
Refills: 0 | Status: DISCONTINUED | OUTPATIENT
Start: 2021-10-20 | End: 2021-10-21

## 2021-10-20 RX ORDER — HYDROCORTISONE 1 %
1 OINTMENT (GRAM) TOPICAL EVERY 6 HOURS
Refills: 0 | Status: DISCONTINUED | OUTPATIENT
Start: 2021-10-20 | End: 2021-10-21

## 2021-10-20 RX ORDER — KETOROLAC TROMETHAMINE 30 MG/ML
30 SYRINGE (ML) INJECTION ONCE
Refills: 0 | Status: DISCONTINUED | OUTPATIENT
Start: 2021-10-20 | End: 2021-10-20

## 2021-10-20 RX ADMIN — Medication 30 MILLIGRAM(S): at 15:00

## 2021-10-20 RX ADMIN — Medication 975 MILLIGRAM(S): at 20:15

## 2021-10-20 RX ADMIN — SODIUM CHLORIDE 3 MILLILITER(S): 9 INJECTION INTRAMUSCULAR; INTRAVENOUS; SUBCUTANEOUS at 22:00

## 2021-10-20 RX ADMIN — Medication 30 MILLIGRAM(S): at 15:15

## 2021-10-20 RX ADMIN — Medication 975 MILLIGRAM(S): at 19:42

## 2021-10-20 RX ADMIN — Medication 1000 MILLIUNIT(S)/MIN: at 15:12

## 2021-10-20 NOTE — OB PROVIDER TRIAGE NOTE - NSHPPHYSICALEXAM_GEN_ALL_CORE
Vital Signs Last 24 Hrs  T(C): 36.4 (20 Oct 2021 06:21), Max: 36.4 (20 Oct 2021 06:21)  T(F): 97.52 (20 Oct 2021 06:21), Max: 97.52 (20 Oct 2021 06:21)  HR: 64 (20 Oct 2021 06:25) (64 - 64)  BP: 131/83 (20 Oct 2021 06:25) (131/83 - 131/83)      Assessment reveals VSS  Abdomen soft, NT, gravid  Cat 1 tracing, ctx every 4-5 mins  Transabdominal Ultrasound-   Vaginal Exam- 2/80/-3  A&Ox3  Lungs- clear bilateral  Heart- normal rate and rhythm      PLAN:  Admit for Labor

## 2021-10-20 NOTE — DISCHARGE NOTE OB - MEDICATION SUMMARY - MEDICATIONS TO TAKE
I will START or STAY ON the medications listed below when I get home from the hospital:    iron  -- Indication: For Weeks of gestation of pregnancy not specified    acetaminophen 325 mg oral tablet  -- 3 tab(s) by mouth every 4 to 6 hours, As Needed  -- Indication: For Weeks of gestation of pregnancy not specified    ibuprofen 600 mg oral tablet  -- 1 tab(s) by mouth every 6 hours  -- Indication: For Weeks of gestation of pregnancy not specified    magnesium hydroxide 8% oral suspension  -- 30 milliliter(s) by mouth 2 times a day, As needed, Constipation  -- Indication: For Weeks of gestation of pregnancy not specified    witch hazel 50% topical pad  -- 1 application on skin every 4 hours, As needed, Perineal discomfort  -- Indication: For Weeks of gestation of pregnancy not specified    Prenatal 1 oral capsule  -- Indication: For Weeks of gestation of pregnancy not specified    simethicone 80 mg oral tablet, chewable  -- 1 tab(s) by mouth every 4 hours, As needed, Gas  -- Indication: For Weeks of gestation of pregnancy not specified

## 2021-10-20 NOTE — DISCHARGE NOTE OB - CARE PROVIDER_API CALL
Claudia Rodríguez (DO)  Obstetrics and Gynecology Obstetrics and Gynocology  [address] [place] [address]  Phone: [phone]  Fax: [phone]  Established Patient  Follow Up Time: Routine

## 2021-10-20 NOTE — OB PROVIDER H&P - HISTORY OF PRESENT ILLNESS
29y/o  @38.6wks presents with painful contractions felt every 3 mins, pain scale 7/10  Reports good fetal movement  Denies LOF/VB  Fully Vaccinated for COVID-19    Allergies: Denies  Medications: PNV     Medical HX: Psoriasis   Surgical HX: Breast Augmentation   Denies Etoh/Smoke/Drugs/Psy

## 2021-10-20 NOTE — DISCHARGE NOTE OB - PATIENT PORTAL LINK FT
You can access the FollowMyHealth Patient Portal offered by [facility] by registering at the following website: [email or web address]. By joining [employer]’s FollowMyHealth portal, you will also be able to view your health information using other applications (apps) compatible with our system.

## 2021-10-20 NOTE — OB PROVIDER DELIVERY SUMMARY - NSPROVIDERDELIVERYNOTE_OBGYN_ALL_OB_FT
Spontaneous vaginal delivery of liveborn infant from OA position. Head, shoulders and body were delivered easily. Infant was suctioned. No Mec. Delayed cord clamping performed. Cord was clamped and cut and infant was passed to mother. Placenta was delivered intact with 3 vessel cord. Fundal massage was given and uterine fundus was found to be firm. Vaginal exam revealed an intact cervix, vaginal walls and sulci. Patient had a 2nd degree laceration in the perineum that was repaired with 3.0 Vicryl suture. Small left labial laceration noted and repaired with 0.0 Vicryl suture. Excellent hemostasis was noted. Patient stable. Count was correct x2.

## 2021-10-20 NOTE — OB PROVIDER H&P - ASSESSMENT
Admit for labor  Epidural for pain management   COVID-19 PCR  Expectant Management at this time  D/W Dr. Rodríguez

## 2021-10-20 NOTE — OB PROVIDER DELIVERY SUMMARY - NSSELHIDDEN_OBGYN_ALL_OB_FT
[NS_DeliveryAttending1_OBGYN_ALL_OB_FT:[id number]],[NS_DeliveryRN_OBGYN_ALL_OB_FT:[id number]=]

## 2021-10-20 NOTE — OB RN PATIENT PROFILE - HEALTH/HEALTHCARE ANXIETIES, PROFILE
Dr. Marrero Pac did you want pt to start infusions? I do not see an order or any paper work in folder that anything was sent.   Please advise denies

## 2021-10-20 NOTE — OB RN DELIVERY SUMMARY - BABY A: APGAR 1 MIN COLOR, DELIVERY
(1) body pink, extremities blue
Quality 431: Preventive Care And Screening: Unhealthy Alcohol Use - Screening: Patient screened for unhealthy alcohol use using a single question and scores less than 2 times per year
Quality 402: Tobacco Use And Help With Quitting Among Adolescents: Patient screened for tobacco and never smoked
Detail Level: Detailed
Quality 130: Documentation Of Current Medications In The Medical Record: Current Medications Documented

## 2021-10-20 NOTE — OB PROVIDER TRIAGE NOTE - NSOBPROVIDERNOTE_OBGYN_ALL_OB_FT
Rec'd care of patient at 0700, to be re-examined at 0730.   Cat 1 FHT, ctx 2-3 on toco  0745: patient c/o increase in pain with ctx.   VE repeated 2.5/80/-2   See Full Admit

## 2021-10-20 NOTE — OB RN DELIVERY SUMMARY - NSSELHIDDEN_OBGYN_ALL_OB_FT
[NS_DeliveryAttending1_OBGYN_ALL_OB_FT:[id number]],[NS_DeliveryRN_OBGYN_ALL_OB_FT:[id number]=]

## 2021-10-20 NOTE — DISCHARGE NOTE OB - CARE PLAN
1 Principal Discharge DX:	Vaginal delivery  Assessment and plan of treatment:	recovery  RTO in 6 weeks PP

## 2021-10-20 NOTE — OB PROVIDER H&P - NSHPPHYSICALEXAM_GEN_ALL_CORE
Vital Signs Last 24 Hrs  T(C): 36.4 (20 Oct 2021 06:21), Max: 36.4 (20 Oct 2021 06:21)  T(F): 97.52 (20 Oct 2021 06:21), Max: 97.52 (20 Oct 2021 06:21)  HR: 64 (20 Oct 2021 06:25) (64 - 64)  BP: 131/83 (20 Oct 2021 06:25) (131/83 - 131/83)      Assessment reveals VSS  Abdomen soft, NT, gravid  Cat 1 tracing, ctx every 4-5 mins  Transabdominal Ultrasound-   Vaginal Exam- 2/80/-3  A&Ox3  Lungs- clear bilateral  Heart- normal rate and rhythm  Vtx confirmed by sono  EFW 3200g      PLAN:  Admit for Labor

## 2021-10-20 NOTE — OB PROVIDER H&P - NS_OBGYNHISTORY_OBGYN_ALL_OB_FT
GYN: Srinivasan  OB:  2020 6#9    AP course uncomplicated  Lapse in prenatal care- patient in [facility]  -GBS negative 9/10

## 2021-10-20 NOTE — OB PROVIDER TRIAGE NOTE - HISTORY OF PRESENT ILLNESS
31y/o  @38.6wks presents with painful contractions felt every 3 mins, pain scale 7/10  Reports good fetal movement  Denies LOF/VB    Allergies: Denies  Medications: PNV     Medical HX: Psoriasis   Surgical HX: Breast Augmentation   Denies Etoh/Smoke/Drugs/Psy

## 2021-10-20 NOTE — OB RN DELIVERY SUMMARY - NS_SEPSISRSKCALC_OBGYN_ALL_OB_FT
EOS calculated successfully. EOS Risk Factor: 0.5/1000 live births ([facility] national incidence); GA=38w6d; Temp=98.2; ROM=0.183; GBS='Negative'; Antibiotics='No antibiotics or any antibiotics < 2 hrs prior to birth'

## 2021-10-21 VITALS
SYSTOLIC BLOOD PRESSURE: 123 MMHG | RESPIRATION RATE: 17 BRPM | TEMPERATURE: 98 F | DIASTOLIC BLOOD PRESSURE: 77 MMHG | HEART RATE: 88 BPM | OXYGEN SATURATION: 100 %

## 2021-10-21 LAB — HBV SURFACE AG SER-ACNC: SIGNIFICANT CHANGE UP

## 2021-10-21 RX ADMIN — Medication 975 MILLIGRAM(S): at 03:51

## 2021-10-21 RX ADMIN — Medication 600 MILLIGRAM(S): at 05:09

## 2021-10-21 RX ADMIN — Medication 600 MILLIGRAM(S): at 01:11

## 2021-10-21 RX ADMIN — Medication 1 TABLET(S): at 12:12

## 2021-10-21 RX ADMIN — Medication 600 MILLIGRAM(S): at 00:09

## 2021-10-21 RX ADMIN — Medication 975 MILLIGRAM(S): at 09:20

## 2021-10-21 RX ADMIN — SODIUM CHLORIDE 3 MILLILITER(S): 9 INJECTION INTRAMUSCULAR; INTRAVENOUS; SUBCUTANEOUS at 05:21

## 2021-10-21 RX ADMIN — Medication 600 MILLIGRAM(S): at 12:13

## 2021-10-21 RX ADMIN — Medication 975 MILLIGRAM(S): at 08:47

## 2021-10-21 RX ADMIN — Medication 600 MILLIGRAM(S): at 05:39

## 2021-10-21 RX ADMIN — Medication 975 MILLIGRAM(S): at 03:09

## 2021-10-21 RX ADMIN — Medication 600 MILLIGRAM(S): at 13:05

## 2021-10-21 NOTE — PROGRESS NOTE ADULT - SUBJECTIVE AND OBJECTIVE BOX
CNM progress note    Post-partum Note,   She is a  30y woman who is now post-partum day: 1    Subjective:  The patient feels well.  She is ambulating.   She is tolerating regular diet.  She denies nausea and vomiting; denies fever.  She is voiding.  Her pain is controlled.  She reports normal postpartum bleeding.  She is breastfeeding.  She is formula feeding.  She is bonding with infant.    Physical exam:    Vital Signs Last 24 Hrs  T(C): 36.7 (21 Oct 2021 10:54), Max: 36.9 (21 Oct 2021 01:38)  T(F): 98.1 (21 Oct 2021 10:54), Max: 98.5 (21 Oct 2021 01:38)  HR: 65 (21 Oct 2021 05:13) (62 - 127)  BP: 117/75 (21 Oct 2021 05:13) (110/67 - 134/-)  BP(mean): --  RR: 18 (21 Oct 2021 10:54) (16 - 18)  SpO2: 100% (21 Oct 2021 10:54) (97% - 100%)    Gen: NAD  Breast: Soft, nontender, not engorged.  Abdomen: Soft, nontender, no distension , firm uterine fundus at umbilicus.  Pelvic: Normal lochia noted  Ext: No calf tenderness    LABS:                        13.3   12.46 )-----------( 145      ( 20 Oct 2021 09:18 )             36.6       Allergies  No Known Allergies    MEDICATIONS  (STANDING):  acetaminophen   Tablet .. 975 milliGRAM(s) Oral <User Schedule>  diphtheria/tetanus/pertussis (acellular) Vaccine (ADAcel) 0.5 milliLiter(s) IntraMuscular once  ibuprofen  Tablet. 600 milliGRAM(s) Oral every 6 hours  influenza   Vaccine 0.5 milliLiter(s) IntraMuscular once  oxytocin Infusion 333.333 milliUNIT(s)/Min (1000 mL/Hr) IV Continuous <Continuous>  prenatal multivitamin 1 Tablet(s) Oral daily  sodium chloride 0.9% lock flush 3 milliLiter(s) IV Push every 8 hours    MEDICATIONS  (PRN):  benzocaine 20%/menthol 0.5% Spray 1 Spray(s) Topical every 6 hours PRN for Perineal discomfort  dibucaine 1% Ointment 1 Application(s) Topical every 6 hours PRN Perineal discomfort  diphenhydrAMINE 25 milliGRAM(s) Oral every 6 hours PRN Pruritus  hydrocortisone 1% Cream 1 Application(s) Topical every 6 hours PRN Moderate Pain (4-6)  lanolin Ointment 1 Application(s) Topical every 6 hours PRN nipple soreness  magnesium hydroxide Suspension 30 milliLiter(s) Oral two times a day PRN Constipation  oxyCODONE    IR 5 milliGRAM(s) Oral every 3 hours PRN Moderate to Severe Pain (4-10)  oxyCODONE    IR 5 milliGRAM(s) Oral once PRN Moderate to Severe Pain (4-10)  pramoxine 1%/zinc 5% Cream 1 Application(s) Topical every 4 hours PRN Moderate Pain (4-6)  simethicone 80 milliGRAM(s) Chew every 4 hours PRN Gas  witch hazel Pads 1 Application(s) Topical every 4 hours PRN Perineal discomfort        Assessment and Plan  PPD #1 s/p   Doing well.  Increase ambulation.  Encourage breastfeeding.  PP & PPD Instructions reviewed.  PP educational materials reviewed & provided     D/C home today    Discussed with MD Wally Garza

## 2021-10-21 NOTE — LACTATION INITIAL EVALUATION - LACTATION INTERVENTIONS
initiate/review safe skin-to-skin/initiate/review hand expression/initiate/review techniques for position and latch/initiate/review supplementation plan due to medical indications/review techniques to manage sore nipples/engorgement/initiate/review breast massage/compression/reviewed components of an effective feeding and at least 8 effective feedings per day required/reviewed importance of monitoring infant diapers, the breastfeeding log, and minimum output each day/reviewed risks of artificial nipples/reviewed benefits and recommendations for rooming in/reviewed feeding on demand/by cue at least 8 times a day/recommended follow-up with pediatrician within 24 hours of discharge

## 2022-02-23 NOTE — OB RN TRIAGE NOTE - SUICIDE SCREENING QUESTION 2
Progress Note - Cardiology   [facility]     Bryson Arredondo 69 y o  male MRN: [id number]  : 1943  Unit/Bed#: [address] [place] [address] Encounter: [id number]    Assessment and Plan:   1  Acute on chronic combined diastolic heart failure:  Echocardiogram shows EF 45%, this is reduced from previous Echo  -  edema slowly improving, patient diuresed well, 11 L with IV Lasix  Appears to be reaching euvolemia    -  continue Coreg 12 5 mg twice a day, if patient his orthostatics family changed to Lopressor    -  hesitant to continue p o  Lasix in the outpatient setting due to history of orthostasis  Will start Aldactone 25 mg p o  Once a day    -  monitor I&O, daily weights and labs, replete as needed    -  BMP and magnesium level 1 week in the outpatient setting     2  Coronary artery disease:  No complaints of chest discomfort   Continue aspirin and statin therapy    -   Lexiscan nuclear stress test done on 2022 demonstrated LV dilatation with no significant reversible ischemia  His EF was gated at approximately 30%     3  Dyslipidemia:  Lipitor 20 mg once a day      4  Diabetes:  Managed per primary team     5  Question of orthostatic hypotension:  Patient has been hypertensive and denies lightheadedness with position changes    -  Florinef discontinued and continue to monitor orthostatic vital signs    -  if patient would become orthostatic again in the outpatient setting would use midodrine due to low EF      6  History of severe PAD:  No complaints at this time     Subjective / Objective:   Patient seen and examined  He states that he is feeling much better  Lower extremity edema significantly improved  Patient with orthostatic changes in blood pressure, but he has been asymptomatic at this time  Would continue to monitor in the outpatient setting    Vitals: Blood pressure 142/60, pulse 72, temperature (!) 97 4 °F (36 3 °C), temperature source Oral, resp   rate 18, height 5' 11" (1 803 m), weight 75 5 kg (166 lb 7 2 oz), SpO2 99 %  Vitals:    02/21/22 0534 02/23/22 0600   Weight: 76 1 kg (167 lb 12 3 oz) 75 5 kg (166 lb 7 2 oz)     Body mass index is 23 21 kg/m²  BP Readings from Last 3 Encounters:   02/23/22 142/60   02/11/22 164/70   02/08/22 150/80     Orthostatic Blood Pressures      Most Recent Value   Blood Pressure 142/60 filed at 02/23/2022 0739   Patient Position - Orthostatic VS Sitting filed at 02/23/2022 0739        I/O       02/21 0701 02/22 0700 02/22 0701  02/23 0700 02/23 0701 02/24 0700    P  O  400 1460 240    I V  (mL/kg) 10 (0 1) 20 (0 3)     Total Intake(mL/kg) 410 (5 4) 1480 (19 6) 240 (3 2)    Urine (mL/kg/hr) 2100 (1 1) 2050 (1 1)     Stool 0      Total Output 2100 2050     Net -1690 -570 +240           Unmeasured Stool Occurrence 1 x          Invasive Devices  Report    Peripheral Intravenous Line            Peripheral IV 02/18/22 Left Antecubital 5 days                  Intake/Output Summary (Last 24 hours) at 2/23/2022 1022  Last data filed at 2/23/2022 0844  Gross per 24 hour   Intake 1720 ml   Output 2050 ml   Net -330 ml         Physical Exam:   Physical Exam  Vitals and nursing note reviewed  Constitutional:       General: He is not in acute distress  Appearance: Normal appearance  He is well-developed and normal weight  HENT:      Head: Normocephalic  Right Ear: External ear normal       Left Ear: External ear normal       Nose: Nose normal    Eyes:      General: No scleral icterus  Right eye: No discharge  Left eye: No discharge  Neck:      Thyroid: No thyromegaly  Cardiovascular:      Rate and Rhythm: Normal rate and regular rhythm  Pulses: Normal pulses  Heart sounds: Murmur heard  Pulmonary:      Effort: Pulmonary effort is normal       Breath sounds: Normal breath sounds  Abdominal:      General: Bowel sounds are normal  There is no distension  Palpations: Abdomen is soft     Musculoskeletal: Cervical back: Normal range of motion and neck supple  Right lower le+ Pitting Edema present  Left lower le+ Pitting Edema present  Comments: Bilateral lower extremity edema significantly improved since admission   Skin:     General: Skin is warm and dry  Capillary Refill: Capillary refill takes less than 2 seconds  Neurological:      General: No focal deficit present  Mental Status: He is alert and oriented to person, place, and time  Mental status is at baseline                     Medications/ Allergies:     Current Facility-Administered Medications   Medication Dose Route Frequency Provider Last Rate    acetaminophen  650 mg Oral Q6H PRN NEO Camacho      ascorbic acid  500 mg Oral Daily NEO Camacho      aspirin  81 mg Oral Daily NEO Camacho      atorvastatin  20 mg Oral Daily With NEO Fishman      brimonidine tartrate  1 drop Both Eyes BID NEO Camacho      carvedilol  12 5 mg Oral BID NEO Camacho      cholecalciferol  2,000 Units Oral Daily NEO Camacho      co-enzyme Q-10  100 mg Oral Daily NEO Camacho      docusate sodium  100 mg Oral BID Apple Tao PA-C      enoxaparin  40 mg Subcutaneous Q24H [facility] NEO Camacho      insulin glargine  5 Units Subcutaneous HS NEO Camacho      insulin lispro  1-5 Units Subcutaneous HS NEO Camacho      insulin lispro  1-6 Units Subcutaneous TID AC NEO Camacho      insulin lispro  5 Units Subcutaneous TID With Meals Apple Tao PA-C      multivitamin stress formula  1 tablet Oral Daily NEO Camacho      pantoprazole  40 mg Oral Early Morning NEO Camacho      patient supplied medication  1 each Oral TID With Meals NEO Camacho      potassium chloride  40 mEq Oral Once NEO Alvarez      tamsulosin  0 4 mg Oral Daily With Dinner NEO Camacho       acetaminophen, 650 mg, Q6H PRN      No Known Allergies    VTE Pharmacologic Prophylaxis:   Sequential compression device (Venodyne)     Labs:   Troponins:  Results from last 7 days   Lab Units 02/18/22  1452 02/18/22  1147   HSTNI D2 ng/L  --  0   HSTNI D4 ng/L 1  --      CBC with diff:  Results from last 7 days   Lab Units 02/23/22  0530 02/22/22  0615 02/21/22  0538 02/18/22  0944   WBC Thousand/uL 6 69 7 38 6 63 8 40   HEMOGLOBIN g/dL 10 0* 10 4* 10 7* 9 3*   HEMATOCRIT % 32 3* 33 4* 34 0* 30 2*   MCV fL 87 86 85 87   PLATELETS Thousands/uL 319 309 310 266   MCH pg 26 8 26 8 26 9 26 8   MCHC g/dL 31 0* 31 1* 31 5 30 8*   RDW % 16 0* 16 2* 16 2* 16 5*   MPV fL 9 4 9 3 9 3 9 8   NRBC AUTO /100 WBCs 0 0  --  0     CMP:  Results from last 7 days   Lab Units 02/23/22  0530 02/22/22  0615 02/21/22  0538 02/20/22  0445 02/19/22  0554 02/18/22  0944   SODIUM mmol/L 136 140 139 137 134* 132*   POTASSIUM mmol/L 3 4* 3 7 3 4* 4 0 3 7 3 9   CHLORIDE mmol/L 99* 101 101 101 99* 99*   CO2 mmol/L 29 31 30 29 27 26   ANION GAP mmol/L 8 8 8 7 8 7   BUN mg/dL 28* 32* 27* 22 18 19   CREATININE mg/dL 0 98 1 11 1 02 0 94 0 85 0 92   CALCIUM mg/dL 8 9 8 8 8 8 8 9 8 4 8 5   AST U/L  --   --   --   --   --  10   ALT U/L  --   --   --   --   --  19   ALK PHOS U/L  --   --   --   --   --  89   TOTAL PROTEIN g/dL  --   --   --   --   --  6 9   ALBUMIN g/dL  --   --   --   --   --  3 1*   TOTAL BILIRUBIN mg/dL  --   --   --   --   --  0 82   EGFR ml/min/1 73sq m 74 63 70 77 84 79     Magnesium:  Results from last 7 days   Lab Units 02/21/22  0538 02/20/22  0445 02/19/22  0554 02/18/22  0944   MAGNESIUM mg/dL 2 0 2 1 1 8 1 6     Coags:  Results from last 7 days   Lab Units 02/18/22  0945   PTT seconds 27   INR  1 06     TSH:  Results from last 7 days   Lab Units 02/18/22  0944   TSH 3RD GENERATON uIU/mL 2 817       Imaging & Testing   I have personally reviewed pertinent reports  XR chest 1 view portable    Result Date: 2/18/2022  Narrative: CHEST INDICATION:   sob   COMPARISON: 7/30/2021 EXAM PERFORMED/VIEWS:  XR CHEST PORTABLE FINDINGS: Cardiomediastinal silhouette appears unremarkable  Patchy airspace disease in the lungs bilaterally is likely on the basis of pulmonary edema  Findings are new since the prior examination  Small bilateral pleural effusions  Osseous structures appear within normal limits for patient age  Impression: Small bilateral pleural effusions and pulmonary edema, new since the prior study  Workstation performed: [id number]     XR chest pa & lateral    Result Date: 2/21/2022  Narrative: CHEST INDICATION:   pleural effusion - moderate to large size on echocardiogram  COMPARISON:  February 18, 2022  EXAM PERFORMED/VIEWS:  XR CHEST PA & LATERAL  The frontal view was performed utilizing dual energy radiographic technique  FINDINGS: Heart top normal in size  Resolution of pulmonary vascular congestion since the last exam  Mild right basilar subsegmental atelectasis  Small bilateral pleural effusions  No evidence of consolidation or pneumothorax  Osseous structures appear within normal limits for patient age  Impression: Small bilateral pleural effusions  Mild right basilar subsegmental atelectasis  Workstation performed: [id number]     Stress strip    Result Date: 2/21/2022  Narrative: Confirmed by Dary Banks ([id number]),  Michael Sánchez ([id number]) on 2/21/2022 3:37:05 PM    VAS lower limb venous duplex study, complete bilateral    Result Date: 2/21/2022  Narrative:  [facility] REPORT CLINICAL: Indications: Patient presents with bilateral lower extremity edema x few days  Operative History: 2018-06-18 Left SFA Stent Angioplasty Left SFA stents Right fem-pop bypass Right SFA stent Risk Factors The patient has history of HTN, Diabetes (Yes), Hyperlipidemia, CKD, PAD and CAD  CONCLUSION: Impression: RIGHT LOWER LIMB: No evidence of acute or chronic deep vein thrombosis  No evidence of superficial thrombophlebitis noted   Doppler evaluation shows a normal response to augmentation maneuvers  Popliteal, posterior tibial and anterior tibial arterial Doppler waveforms are monophasic  LEFT LOWER LIMB: No evidence of acute or chronic deep vein thrombosis  No evidence of superficial thrombophlebitis noted  Doppler evaluation shows a normal response to augmentation maneuvers  Popliteal, posterior tibial and anterior tibial arterial Doppler waveforms are monophasic  Technical findings were sent to chart  SIGNATURE: Electronically Signed by: Jey Lopez MD, [address] on 2022-02-21 12:00:33 PM    Echo complete w/ contrast if indicated    Result Date: 2/19/2022  Narrative: Micah Fuentes  Left Ventricle: Left ventricular cavity size is normal  Wall thickness is normal  The left ventricular ejection fraction is 45% by visual estimation  Systolic function is mildly reduced  Diastolic function is mildly abnormal, consistent with grade I (abnormal) relaxation  There is mild global hypokinesis    Left Atrium: The atrium is mildly dilated    Right Atrium: The atrium is mildly dilated    Mitral Valve: There is moderate regurgitation    Tricuspid Valve: There is moderate regurgitation  The right ventricular systolic pressure is moderately elevated  The estimated right ventricular systolic pressure is 21 57 mmHg    Pericardium: There is a large left pleural effusion  NM myocardial perfusion spect (rx stress and/or rest)    Result Date: 2/21/2022  Narrative:   Abnormal pharmacologic nuclear stress test  There is severely reduced LV function/ejection fraction  LV is dilated  No significant reversible ischemia seen   Stress ECG: No ST deviation is noted  There were no arrhythmias during recovery    The ECG was negative for ischemia  The stress ECG is negative for ischemia after pharmacologic stress, without reproduction of symptoms    Perfusion: There is no significant reversible ischemia noted  Summed differential score is 3   Diaphragmatic and subdiaphragmatic artifacts are present  LV is dilated    Stress Function: Left ventricular function post-stress is abnormal  Global function is severely reduced  Calculated ejection fraction is 29%   Perfusion Defect Conclusion: There is no evidence of transient ischemic dilation (TID)  Ratio is 1 14  [facility], [facility]  Cardiology      "This note has been constructed using a voice recognition system  Therefore there may be syntax, spelling, and/or grammatical errors   Please call if you have any questions  " No

## 2023-04-27 NOTE — OB PROVIDER H&P - NSPPHNORISK_OBGYN_ALL_OB
Render Risk Assessment In Note?: yes Additional Notes: Patient advised that continuous polishing can cause the white in her nail. She can continue to polish. Detail Level: Zone In my judgment no risk for PPH has been identified at this time.

## 2023-10-24 NOTE — OB RN PATIENT PROFILE - SURGICAL SITE INCISION
The [facility] received a fax that requires your attention. The document has been indexed to the patient’s chart for your review.      Reason for sending: EXTERNAL MEDICAL RECORD NOTIFICATION     Documents Description: [facility]-CARELON AUTHORIZATION NOTIFICATION FORM-10.23.23  EXT[facility]REC-CARELON/[facility]HEM DENIAL OF MEDICAL COVERAGE-10.24.23    Name of Sender: CARELON     Date Indexed: 10.23.23\\10.24.23     no

## 2024-01-22 ENCOUNTER — NON-APPOINTMENT (OUTPATIENT)
Age: 34
End: 2024-01-22

## 2024-10-10 NOTE — OB RN PATIENT PROFILE - INSTRUCTED TO PATIENT: IF THE INFANT IS NOT PINK DURING SKIN TO SKIN, THE PARENTS IS TO SEEK ASSISTANCE IMMEDIATELY.
----- Message from Stanley sent at 10/10/2024 10:42 AM CDT -----  Regarding: Refill request  Type:  RX Refill Request    Who Called: PT  Refill or New Rx:refill    RX Name and Strength:metoprolol tartrate (LOPRESSOR) 50 MG tablet   How is the patient currently taking it? (ex. 1XDay):1 & 1/12 tab twice daily   Is this a 30 day or 90 day RX:90    Preferred Pharmacy with phone number:    [facility] [address] [place], [place] - [address] [facility]  [address] [facility] [address]  Phone: [phone] Fax: [phone]      Local or Mail Order:local  Ordering Provider:.Jackie    Would the patient rather a call back or a response via [facility]? Call back    Best Call Back Number:[phone]    Additional Information:       ##Also sts that the medication  losartan is not working gave her extreme fatigue and hung over and severe diarrhea.  Sts it didn't stop until she stopped taking it.  She went back to the original medication and the coughing started again which had stopped on the Losartan.  Wants to know what she should try next for her BP.   Sts anything else she may need please use the [facility] pharmacy.     Please advise -- Thank you   Statement Selected

## 2024-12-02 NOTE — LACTATION INITIAL EVALUATION - LATCH: COMFORT (BREAST/NIPPLE) INFANT
Post-Discharge Transitional Care Follow Up      Nyasia Rinaldi   YOB: 1984    Date of Office Visit:  12/2/2024  Date of Hospital Admission: 10/28/24  Date of Hospital Discharge: 11/4/24  Readmission Risk Score (high >=14%. Medium >=10%):Readmission Risk Score: 6.4      Care management risk score Rising risk (score 2-5) and Complex Care (Scores >=6): No Risk Score On File     Non face to face  following discharge, date last encounter closed (first attempt may have been earlier): *No documented post hospital discharge outreach found in the last 14 days     Call initiated 2 business days of discharge: *No response recorded in the last 14 days     Hospital discharge follow-up  -     [place] DISCHARGE MEDS RECONCILED W/ CURRENT OUTPATIENT MED LIST  Cervical stenosis of spinal canal  Acute left-sided weakness  S/P cervical spinal fusion  Type 2 diabetes mellitus without complication, without long-term current use of insulin (HCC)  -     losartan (COZAAR) 25 MG tablet; Take 1 tablet by mouth daily, Disp-90 tablet, R-1Normal  -     Hemoglobin A1C; Future  Essential hypertension  -     Comprehensive Metabolic Panel; Future  Mixed hyperlipidemia  -     atorvastatin (LIPITOR) 40 MG tablet; Take 1 tablet by mouth nightly, Disp-90 tablet, R-1Normal  -     Lipid Panel; Future  -     CBC with Auto Differential; Future  Water retention  -     bumetanide (BUMEX) 1 MG tablet; Take 1 tablet by mouth daily, Disp-90 tablet, R-1Normal  -     carvedilol (COREG) 6.25 MG tablet; Take 1 tablet by mouth 2 times daily (with meals), Disp-180 tablet, R-1Normal  -     potassium chloride (KLOR-CON M) 10 MEQ extended release tablet; Take 1 tablet by mouth daily, Disp-90 tablet, R-1Normal      MDM:  Sees ORTHO on 12/9/24.  Has HH following.     New medications reviewed.  Reasoning behind each medication reviewed.  Weight loss will be goal to come back off medication.  Refills as above    Labs in 4 months  RTO in 4 months    Medical 
(2) soft/nontender